# Patient Record
Sex: MALE | Race: WHITE | NOT HISPANIC OR LATINO | Employment: FULL TIME | URBAN - METROPOLITAN AREA
[De-identification: names, ages, dates, MRNs, and addresses within clinical notes are randomized per-mention and may not be internally consistent; named-entity substitution may affect disease eponyms.]

---

## 2021-08-07 PROBLEM — M25.529 PAIN IN ELBOW: Status: ACTIVE | Noted: 2020-07-02

## 2021-08-07 PROBLEM — K64.5 THROMBOSED EXTERNAL HEMORRHOIDS: Status: ACTIVE | Noted: 2021-05-20

## 2021-08-07 PROBLEM — E66.9 OBESITY: Status: ACTIVE | Noted: 2020-07-02

## 2023-03-03 ENCOUNTER — TELEPHONE (OUTPATIENT)
Dept: FAMILY MEDICINE CLINIC | Facility: CLINIC | Age: 38
End: 2023-03-03

## 2023-03-03 NOTE — TELEPHONE ENCOUNTER
Caller: Christine Ng    Relationship to patient: Emergency Contact    Best call back number: [phone]    New or established patient?  [x] New  [] Established    Date of discharge: 3/3/23    Facility discharged from: 3/3/23    Diagnosis/Symptoms: NAUSEA, HEAD ACHES, DIZZINESS, HIGH BP    Length of stay (If applicable): ER RELEASED SAME DAY     Specialty Only: Did you see a [facility] provider?    [] Yes  [] No  If so, who?    SCHEDULED WITH DR LUCIA ON 3/6/23

## 2023-03-06 ENCOUNTER — OFFICE VISIT (OUTPATIENT)
Dept: FAMILY MEDICINE CLINIC | Facility: CLINIC | Age: 38
End: 2023-03-06
Payer: COMMERCIAL

## 2023-03-06 VITALS
SYSTOLIC BLOOD PRESSURE: 160 MMHG | OXYGEN SATURATION: 97 % | TEMPERATURE: 98.6 F | WEIGHT: 271.4 LBS | RESPIRATION RATE: 20 BRPM | HEART RATE: 77 BPM | HEIGHT: 71 IN | DIASTOLIC BLOOD PRESSURE: 100 MMHG | BODY MASS INDEX: 37.99 KG/M2

## 2023-03-06 DIAGNOSIS — G47.30 SLEEP APNEA, UNSPECIFIED TYPE: ICD-10-CM

## 2023-03-06 DIAGNOSIS — I10 HYPERTENSION, UNSPECIFIED TYPE: Primary | ICD-10-CM

## 2023-03-06 DIAGNOSIS — E66.01 CLASS 2 SEVERE OBESITY WITH SERIOUS COMORBIDITY AND BODY MASS INDEX (BMI) OF 37.0 TO 37.9 IN ADULT, UNSPECIFIED OBESITY TYPE: ICD-10-CM

## 2023-03-06 PROCEDURE — 99214 OFFICE O/P EST MOD 30 MIN: CPT | Performed by: FAMILY MEDICINE

## 2023-03-06 RX ORDER — AMLODIPINE BESYLATE 5 MG/1
5 TABLET ORAL DAILY
Qty: 30 TABLET | Refills: 1 | Status: SHIPPED | OUTPATIENT
Start: 2023-03-06 | End: 2023-03-20 | Stop reason: DRUGHIGH

## 2023-03-06 NOTE — PROGRESS NOTES
Chief Complaint  Hypertension (Patient stated his BP was high last Friday 168/106. Went to the ER at [facility] on Friday.), Establish Care, and Headache    37-year-old male presents to the office to establish care and for the evaluation of high blood pressure.    Patient was seen in the urgent care on March 3, 2023 for elevated blood pressure, high blood pressure was 160s over 100.  Patient was experiencing headache, blurred vision and lightheadedness.  Patient was then sent to the ED, blood pressure was around 150s/100s however his headache and lightheadedness.  Patient refused work-up in the ED and preferred to see primary care for labs and treatment.    Patient has history of hypercholesterolemia, denies history of other chronic medical problems.     Today patient denies lightheadedness or blurred vision, he does have a headache, however he states he has this headache every day, more severe in the morning when he wakes up.  The patient does not snore and stop breathing at during sleep per his wife.    Patient denies chest pain, shortness of breath, palpitation, lower extremity swelling.      Tommy         Bernard Hernandez presents to [facility]  History of Present Illness      Objective     Review of Systems   Constitutional: Negative for activity change.   Respiratory: Negative for chest tightness and shortness of breath.    Cardiovascular: Negative for chest pain, palpitations and leg swelling.   Gastrointestinal: Negative for abdominal pain, constipation, diarrhea, nausea and vomiting.   Endocrine: Negative for polydipsia and polyuria.   Genitourinary: Negative for nocturia.   Neurological: Positive for headache. Negative for dizziness, seizures, light-headedness and numbness.        Past Medical History:   Diagnosis Date   • Fracture, finger, distal phalanx     Left        Current Outpatient Medications:   •  acetaminophen (TYLENOL) 500 MG tablet, 1 tablet As  "Needed., Disp: , Rfl:   •  amLODIPine (NORVASC) 5 MG tablet, Take 1 tablet by mouth Daily., Disp: 30 tablet, Rfl: 1   Social History     Socioeconomic History   • Marital status:    Tobacco Use   • Smoking status: Never   • Smokeless tobacco: Never   Vaping Use   • Vaping Use: Never used   Substance and Sexual Activity   • Alcohol use: No   • Drug use: No   • Sexual activity: Never      Vital Signs:   /100   Pulse 77   Temp 98.6 °F (37 °C)   Resp 20   Ht 180.3 cm (71\")   Wt 123 kg (271 lb 6.4 oz)   SpO2 97%   BMI 37.85 kg/m²       Physical Exam  Constitutional:       General: He is not in acute distress.     Appearance: He is obese. He is not ill-appearing.   HENT:      Head: Normocephalic and atraumatic.   Eyes:      Extraocular Movements: Extraocular movements intact.      Pupils: Pupils are equal, round, and reactive to light.   Neck:      Vascular: No carotid bruit.   Cardiovascular:      Rate and Rhythm: Normal rate and regular rhythm.      Pulses: Normal pulses.      Heart sounds: No murmur heard.  Pulmonary:      Effort: Pulmonary effort is normal.      Breath sounds: Normal breath sounds. No rales.   Abdominal:      General: Bowel sounds are normal. There is no distension.      Palpations: Abdomen is soft. There is no mass.      Tenderness: There is no abdominal tenderness. There is no guarding.      Hernia: No hernia is present.   Musculoskeletal:      Right lower leg: No edema.      Left lower leg: No edema.   Lymphadenopathy:      Cervical: No cervical adenopathy.   Skin:     Capillary Refill: Capillary refill takes less than 2 seconds.   Neurological:      Mental Status: He is alert.      GCS: GCS eye subscore is 4. GCS verbal subscore is 5. GCS motor subscore is 6.      Deep Tendon Reflexes:      Reflex Scores:       Patellar reflexes are 2+ on the right side and 2+ on the left side.         Result Review :                 Assessment and Plan    Diagnoses and all orders for this " visit:    1. Hypertension, unspecified type (Primary)  -     CBC & Differential; Future  -     Lipid panel; Future  -     Comprehensive metabolic panel; Future  -     TSH Rfx On Abnormal To Free T4; Future  -     amLODIPine (NORVASC) 5 MG tablet; Take 1 tablet by mouth Daily.  Dispense: 30 tablet; Refill: 1  -     TSH Rfx On Abnormal To Free T4  -     Comprehensive metabolic panel  -     Lipid panel  -     CBC & Differential    2. Sleep apnea, unspecified type    3. Class 2 severe obesity with serious comorbidity and body mass index (BMI) of 37.0 to 37.9 in adult, unspecified obesity type (HCC)  -     Hemoglobin A1c; Future  -     Hemoglobin A1c          Hypertension:  His blood pressure today is 160/100,  Will get routine labs, CBC and CMP  Will start patient on amlodipine 5 mg  Advised patient to reduce red meat, carbohydrates and salt in his diet, I also advised with increased exercise and starting Mediterranean and DASH diet    Obesity  Increase exercise 20 to 30 minutes daily  Avoid fatty food  Lipid panel and A1c and TSH    Sleep apnea  The wife describes episode complete stop breathing during sleep, this is most likely obstructive sleep apnea due to obesity, I will refer patient to sleep medicine for sleep study.    Patient verbalized understanding and agreement with the plan above.  Return in 2 weeks for follow-up      Follow Up   Return in about 2 weeks (around 3/20/2023).  Patient was given instructions and counseling regarding his condition or for health maintenance advice. Please see specific information pulled into the AVS if appropriate.

## 2023-03-09 LAB
ALBUMIN SERPL-MCNC: 4.6 G/DL (ref 3.5–5.2)
ALBUMIN/GLOB SERPL: 1.8 G/DL
ALP SERPL-CCNC: 81 U/L (ref 39–117)
ALT SERPL-CCNC: 29 U/L (ref 1–41)
AST SERPL-CCNC: 18 U/L (ref 1–40)
BASOPHILS # BLD AUTO: 0.09 10*3/MM3 (ref 0–0.2)
BASOPHILS NFR BLD AUTO: 1.2 % (ref 0–1.5)
BILIRUB SERPL-MCNC: 0.6 MG/DL (ref 0–1.2)
BUN SERPL-MCNC: 13 MG/DL (ref 6–20)
BUN/CREAT SERPL: 14.6 (ref 7–25)
CALCIUM SERPL-MCNC: 10.1 MG/DL (ref 8.6–10.5)
CHLORIDE SERPL-SCNC: 102 MMOL/L (ref 98–107)
CHOLEST SERPL-MCNC: 225 MG/DL (ref 0–200)
CO2 SERPL-SCNC: 29.4 MMOL/L (ref 22–29)
CREAT SERPL-MCNC: 0.89 MG/DL (ref 0.76–1.27)
EGFRCR SERPLBLD CKD-EPI 2021: 111.8 ML/MIN/1.73
EOSINOPHIL # BLD AUTO: 0.11 10*3/MM3 (ref 0–0.4)
EOSINOPHIL NFR BLD AUTO: 1.4 % (ref 0.3–6.2)
ERYTHROCYTE [DISTWIDTH] IN BLOOD BY AUTOMATED COUNT: 13.1 % (ref 12.3–15.4)
GLOBULIN SER CALC-MCNC: 2.6 GM/DL
GLUCOSE SERPL-MCNC: 91 MG/DL (ref 65–99)
HBA1C MFR BLD: 5.6 % (ref 4.8–5.6)
HCT VFR BLD AUTO: 44.2 % (ref 37.5–51)
HDLC SERPL-MCNC: 50 MG/DL (ref 40–60)
HGB BLD-MCNC: 14.6 G/DL (ref 13–17.7)
IMM GRANULOCYTES # BLD AUTO: 0.02 10*3/MM3 (ref 0–0.05)
IMM GRANULOCYTES NFR BLD AUTO: 0.3 % (ref 0–0.5)
LDLC SERPL CALC-MCNC: 153 MG/DL (ref 0–100)
LYMPHOCYTES # BLD AUTO: 2.06 10*3/MM3 (ref 0.7–3.1)
LYMPHOCYTES NFR BLD AUTO: 26.6 % (ref 19.6–45.3)
MCH RBC QN AUTO: 28.6 PG (ref 26.6–33)
MCHC RBC AUTO-ENTMCNC: 33 G/DL (ref 31.5–35.7)
MCV RBC AUTO: 86.5 FL (ref 79–97)
MONOCYTES # BLD AUTO: 0.53 10*3/MM3 (ref 0.1–0.9)
MONOCYTES NFR BLD AUTO: 6.9 % (ref 5–12)
NEUTROPHILS # BLD AUTO: 4.92 10*3/MM3 (ref 1.7–7)
NEUTROPHILS NFR BLD AUTO: 63.6 % (ref 42.7–76)
NRBC BLD AUTO-RTO: 0 /100 WBC (ref 0–0.2)
PLATELET # BLD AUTO: 353 10*3/MM3 (ref 140–450)
POTASSIUM SERPL-SCNC: 5.2 MMOL/L (ref 3.5–5.2)
PROT SERPL-MCNC: 7.2 G/DL (ref 6–8.5)
RBC # BLD AUTO: 5.11 10*6/MM3 (ref 4.14–5.8)
SODIUM SERPL-SCNC: 139 MMOL/L (ref 136–145)
TRIGL SERPL-MCNC: 123 MG/DL (ref 0–150)
TSH SERPL DL<=0.005 MIU/L-ACNC: 1.32 UIU/ML (ref 0.27–4.2)
VLDLC SERPL CALC-MCNC: 22 MG/DL (ref 5–40)
WBC # BLD AUTO: 7.73 10*3/MM3 (ref 3.4–10.8)

## 2023-03-13 ENCOUNTER — PATIENT ROUNDING (BHMG ONLY) (OUTPATIENT)
Dept: FAMILY MEDICINE CLINIC | Facility: CLINIC | Age: 38
End: 2023-03-13
Payer: COMMERCIAL

## 2023-03-13 NOTE — PROGRESS NOTES
Sent Patient following in [employer] Message:  My name is Jenn Cunningham      I am the Practice Manager with   [facility] [place]  [address], [address]  [place] [address] [phone].      I am messaging to officially welcome you to our practice and ask about your recent visit.     Tell me about your visit with us. What things went well?         We're always looking for ways to make our patients' experiences even better. Do you have recommendations on ways we may improve?       Overall were you satisfied with your first visit to our practice?        Is there anything else I can do for you?     You may receive a survey from [employer] via text or email to provide feedback about your visit.  We ask that you please take a few minutes to complete the survey and let us know how we are doing.  If for any reason you feel unable to give us the highest rating please let me know.      Thank you, and have a great day.

## 2023-03-20 ENCOUNTER — OFFICE VISIT (OUTPATIENT)
Dept: FAMILY MEDICINE CLINIC | Facility: CLINIC | Age: 38
End: 2023-03-20
Payer: COMMERCIAL

## 2023-03-20 VITALS
WEIGHT: 271.4 LBS | SYSTOLIC BLOOD PRESSURE: 138 MMHG | RESPIRATION RATE: 18 BRPM | DIASTOLIC BLOOD PRESSURE: 100 MMHG | BODY MASS INDEX: 37.85 KG/M2 | HEART RATE: 110 BPM | TEMPERATURE: 98.2 F | OXYGEN SATURATION: 96 %

## 2023-03-20 DIAGNOSIS — I10 HYPERTENSION, UNSPECIFIED TYPE: ICD-10-CM

## 2023-03-20 PROCEDURE — 99214 OFFICE O/P EST MOD 30 MIN: CPT | Performed by: FAMILY MEDICINE

## 2023-03-20 RX ORDER — HYDROCHLOROTHIAZIDE 12.5 MG/1
12.5 TABLET ORAL DAILY
Qty: 30 TABLET | Refills: 0 | Status: SHIPPED | OUTPATIENT
Start: 2023-03-20 | End: 2023-03-27 | Stop reason: DRUGHIGH

## 2023-03-20 RX ORDER — AMLODIPINE BESYLATE 10 MG/1
10 TABLET ORAL DAILY
Qty: 30 TABLET | Refills: 0 | Status: SHIPPED | OUTPATIENT
Start: 2023-03-20

## 2023-03-20 NOTE — PROGRESS NOTES
Chief Complaint  Follow-up and Hypertension    Subjective         Bernard Ng presents to [facility]  History of Present Illness      37-year-old male presents to the office for hypertension follow-up.  Patient was started on amlodipine 5 mg for hypertension 2 weeks ago.  His lab work showed normal kidney function and normal TSH.  Since starting the medication 2 weeks ago blood pressure readings at home are consistently high 150s-170s /100s.  Patient denies associated symptoms with the high blood pressure other than occasional headache.  Patient denies chest pain or shortness of breath.  Patient denies medication side effects, lightheadedness or dizziness.    Patient states he has been trying to eat better, he has reduced his meal size to half, however he still did not lose weight yet.  Sleep medicine referral pending for sleep apnea study.    Objective     Review of Systems     Past Medical History:   Diagnosis Date   • Fracture, finger, distal phalanx     Left        Current Outpatient Medications:   •  acetaminophen (TYLENOL) 500 MG tablet, 1 tablet As Needed. (Patient not taking: Reported on 3/20/2023), Disp: , Rfl:   •  amLODIPine (NORVASC) 10 MG tablet, Take 1 tablet by mouth Daily., Disp: 30 tablet, Rfl: 0  •  hydroCHLOROthiazide (HYDRODIURIL) 12.5 MG tablet, Take 1 tablet by mouth Daily., Disp: 30 tablet, Rfl: 0   Social History     Socioeconomic History   • Marital status:    Tobacco Use   • Smoking status: Never   • Smokeless tobacco: Never   Vaping Use   • Vaping Use: Never used   Substance and Sexual Activity   • Alcohol use: No   • Drug use: No   • Sexual activity: Never      Vital Signs:   /100   Pulse 110   Temp 98.2 °F (36.8 °C)   Resp 18   Wt 123 kg (271 lb 6.4 oz)   SpO2 96%   BMI 37.85 kg/m²       Physical Exam  Cardiovascular:      Rate and Rhythm: Normal rate and regular rhythm.      Heart sounds: No murmur heard.  Pulmonary:      Effort:  Pulmonary effort is normal. No respiratory distress.   Skin:     Capillary Refill: Capillary refill takes less than 2 seconds.   Neurological:      Mental Status: He is alert.          Result Review :                 Assessment and Plan    Diagnoses and all orders for this visit:    1. Hypertension, unspecified type  -     hydroCHLOROthiazide (HYDRODIURIL) 12.5 MG tablet; Take 1 tablet by mouth Daily.  Dispense: 30 tablet; Refill: 0  -     amLODIPine (NORVASC) 10 MG tablet; Take 1 tablet by mouth Daily.  Dispense: 30 tablet; Refill: 0  -     Urinalysis With Microscopic - Urine, Clean Catch      · Blood pressure constantly elevated at home, it is elevated in the office today 138/100.  · Asymptomatic except for occasional headaches  · Blood work unremarkable  · Will increase Norvasc to 10 mg  · We will add hydrochlorothiazide 12.5 mg  · Get urinalysis today  · Repeat BMP next week  · Follow-up next week  · Advised patient to call back if he experienced any side effects, dizziness or lightheadedness  · Advised patient to go to the ED if he experienced any chest pain or shortness of breath  ·       Follow Up   Return in about 1 week (around 3/27/2023).  Patient was given instructions and counseling regarding his condition or for health maintenance advice. Please see specific information pulled into the AVS if appropriate.

## 2023-03-21 LAB
APPEARANCE UR: CLEAR
BACTERIA #/AREA URNS HPF: NORMAL /[HPF]
BILIRUB UR QL STRIP: NEGATIVE
CASTS URNS QL MICRO: NORMAL /LPF
COLOR UR: YELLOW
EPI CELLS #/AREA URNS HPF: NORMAL /HPF (ref 0–10)
GLUCOSE UR QL STRIP: NEGATIVE
HGB UR QL STRIP: NEGATIVE
KETONES UR QL STRIP: NEGATIVE
LEUKOCYTE ESTERASE UR QL STRIP: NEGATIVE
MICRO URNS: NORMAL
MICRO URNS: NORMAL
NITRITE UR QL STRIP: NEGATIVE
PH UR STRIP: 5.5 [PH] (ref 5–7.5)
PROT UR QL STRIP: NEGATIVE
RBC #/AREA URNS HPF: NORMAL /HPF (ref 0–2)
SP GR UR STRIP: 1.02 (ref 1–1.03)
UROBILINOGEN UR STRIP-MCNC: 0.2 MG/DL (ref 0.2–1)
WBC #/AREA URNS HPF: NORMAL /HPF (ref 0–5)

## 2023-03-21 RX ORDER — DIAPER,BRIEF,INFANT-TODD,DISP
EACH MISCELLANEOUS
COMMUNITY
End: 2023-03-24

## 2023-03-27 ENCOUNTER — OFFICE VISIT (OUTPATIENT)
Dept: FAMILY MEDICINE CLINIC | Facility: CLINIC | Age: 38
End: 2023-03-27
Payer: COMMERCIAL

## 2023-03-27 VITALS
HEIGHT: 71 IN | BODY MASS INDEX: 37.52 KG/M2 | OXYGEN SATURATION: 97 % | HEART RATE: 102 BPM | DIASTOLIC BLOOD PRESSURE: 98 MMHG | WEIGHT: 268 LBS | SYSTOLIC BLOOD PRESSURE: 130 MMHG | RESPIRATION RATE: 20 BRPM | TEMPERATURE: 98.2 F

## 2023-03-27 DIAGNOSIS — I10 HYPERTENSION, UNSPECIFIED TYPE: ICD-10-CM

## 2023-03-27 PROCEDURE — 99214 OFFICE O/P EST MOD 30 MIN: CPT | Performed by: FAMILY MEDICINE

## 2023-03-27 RX ORDER — HYDROCHLOROTHIAZIDE 25 MG/1
25 TABLET ORAL DAILY
Qty: 30 TABLET | Refills: 1 | Status: SHIPPED | OUTPATIENT
Start: 2023-03-27

## 2023-03-27 RX ORDER — AZITHROMYCIN 250 MG/1
TABLET, FILM COATED ORAL
COMMUNITY
Start: 2023-03-27 | End: 2023-03-27

## 2023-03-27 NOTE — PROGRESS NOTES
"Chief Complaint  Follow-up (Hypertension and hospital for pneumonia.)    37-year-old male presents to the office for hypertension follow-up.  Last visit Norvasc was increased to 10 mg and patient was started on hydralazine 12.5 mg.  Patient is denies side effects.  Patient has been measuring his blood pressure at at home daily, values range from 140/90 to 170/100.  Patient denies symptoms with his blood pressure readings.  Patient denies chest pain, shortness of breath, lower extremity swelling.    Tommy Ng presents to [facility]  History of Present Illness      Objective     Review of Systems     Past Medical History:   Diagnosis Date   • Fracture, finger, distal phalanx     Left        Current Outpatient Medications:   •  amLODIPine (NORVASC) 10 MG tablet, Take 1 tablet by mouth Daily., Disp: 30 tablet, Rfl: 0  •  hydroCHLOROthiazide (HYDRODIURIL) 25 MG tablet, Take 1 tablet by mouth Daily., Disp: 30 tablet, Rfl: 1   Social History     Socioeconomic History   • Marital status:    Tobacco Use   • Smoking status: Never   • Smokeless tobacco: Never   Vaping Use   • Vaping Use: Never used   Substance and Sexual Activity   • Alcohol use: No   • Drug use: No   • Sexual activity: Never      Vital Signs:   /98   Pulse 102   Temp 98.2 °F (36.8 °C)   Resp 20   Ht 180.3 cm (71\")   Wt 122 kg (268 lb)   SpO2 97%   BMI 37.38 kg/m²       Physical Exam  Constitutional:       Appearance: Normal appearance.   Cardiovascular:      Rate and Rhythm: Normal rate and regular rhythm.   Pulmonary:      Effort: Pulmonary effort is normal.      Breath sounds: Normal breath sounds.   Musculoskeletal:      Right lower leg: No edema.      Left lower leg: No edema.   Neurological:      Mental Status: He is alert.          Result Review :                 Assessment and Plan    Diagnoses and all orders for this visit:    1. Hypertension, unspecified type  -     " hydroCHLOROthiazide (HYDRODIURIL) 25 MG tablet; Take 1 tablet by mouth Daily.  Dispense: 30 tablet; Refill: 1  -     Basic metabolic panel    · Blood pressure in the office today 130/100  · Increase hydrochlorothiazide to 25 mg daily  · Repeat BMP due to patient on Lasix.  · Follow-up in 2 weeks  · Advised patient to call back if he developed any worsening symptoms or medication side effects  · Patient verbalized understanding and agreement with the plan above.        Follow Up   Return in about 2 weeks (around 4/10/2023).  Patient was given instructions and counseling regarding his condition or for health maintenance advice. Please see specific information pulled into the AVS if appropriate.

## 2023-03-28 LAB
BUN SERPL-MCNC: 14 MG/DL (ref 6–20)
BUN/CREAT SERPL: 14 (ref 9–20)
CALCIUM SERPL-MCNC: 10.1 MG/DL (ref 8.7–10.2)
CHLORIDE SERPL-SCNC: 100 MMOL/L (ref 96–106)
CO2 SERPL-SCNC: 26 MMOL/L (ref 20–29)
CREAT SERPL-MCNC: 0.99 MG/DL (ref 0.76–1.27)
EGFRCR SERPLBLD CKD-EPI 2021: 99 ML/MIN/1.73
GLUCOSE SERPL-MCNC: 79 MG/DL (ref 70–99)
POTASSIUM SERPL-SCNC: 4.8 MMOL/L (ref 3.5–5.2)
SODIUM SERPL-SCNC: 142 MMOL/L (ref 134–144)

## 2023-04-11 ENCOUNTER — OFFICE VISIT (OUTPATIENT)
Dept: FAMILY MEDICINE CLINIC | Facility: CLINIC | Age: 38
End: 2023-04-11
Payer: COMMERCIAL

## 2023-04-11 VITALS
BODY MASS INDEX: 37.63 KG/M2 | TEMPERATURE: 98 F | WEIGHT: 268.8 LBS | SYSTOLIC BLOOD PRESSURE: 112 MMHG | RESPIRATION RATE: 20 BRPM | DIASTOLIC BLOOD PRESSURE: 86 MMHG | OXYGEN SATURATION: 97 % | HEIGHT: 71 IN | HEART RATE: 102 BPM

## 2023-04-11 DIAGNOSIS — I10 HYPERTENSION, UNSPECIFIED TYPE: Primary | ICD-10-CM

## 2023-04-11 RX ORDER — HYDROCHLOROTHIAZIDE 50 MG/1
50 TABLET ORAL DAILY
Qty: 30 TABLET | Refills: 1 | Status: SHIPPED | OUTPATIENT
Start: 2023-04-11

## 2023-04-11 NOTE — PROGRESS NOTES
"Chief Complaint  Follow-up (hypertension)    Subjective         Bernard Ng presents to [facility]  History of Present Illness    37-year-old male presents to the office for hypertension follow-up.  Hydrocodone was increased to 25 mg last visit, he is also on moderate pain 10 mg.  Patient denies medication side effects, he denies dizziness, lightheadedness, chest pain, heart palpitation or lower extremity swelling.  Patient states that he is trying to lose weight.  Patient has been keeping a log of his blood pressure at home.  Ranges from 1  / 90s to 100s, without associated symptoms.      Objective     Review of Systems     Past Medical History:   Diagnosis Date   • Fracture, finger, distal phalanx     Left        Current Outpatient Medications:   •  amLODIPine (NORVASC) 10 MG tablet, Take 1 tablet by mouth Daily., Disp: 30 tablet, Rfl: 0  •  hydroCHLOROthiazide (HYDRODIURIL) 50 MG tablet, Take 1 tablet by mouth Daily., Disp: 30 tablet, Rfl: 1   Social History     Socioeconomic History   • Marital status:    Tobacco Use   • Smoking status: Never   • Smokeless tobacco: Never   Vaping Use   • Vaping Use: Never used   Substance and Sexual Activity   • Alcohol use: No   • Drug use: No   • Sexual activity: Never      Vital Signs:   /86 (BP Location: Right arm)   Pulse 102   Temp 98 °F (36.7 °C)   Resp 20   Ht 180.3 cm (71\")   Wt 122 kg (268 lb 12.8 oz)   SpO2 97%   BMI 37.49 kg/m²       Physical Exam  HENT:      Head: Normocephalic and atraumatic.   Cardiovascular:      Rate and Rhythm: Normal rate and regular rhythm.      Pulses: Normal pulses.      Heart sounds: No murmur heard.  Pulmonary:      Effort: Pulmonary effort is normal. No respiratory distress.      Breath sounds: Normal breath sounds.   Musculoskeletal:      Right lower leg: No edema.      Left lower leg: No edema.          Result Review :                 Assessment and Plan    Diagnoses and all " orders for this visit:    1. Hypertension, unspecified type (Primary)  -     hydroCHLOROthiazide (HYDRODIURIL) 50 MG tablet; Take 1 tablet by mouth Daily.  Dispense: 30 tablet; Refill: 1  -     Basic metabolic panel  -     Blood Pressure Device  -     Basic metabolic panel; Future      · Hypertension still not very well controlled.  · There is a discrepancy between the blood pressure measured at the office and at home.  · Today 114/88 measured by me in the office, continues to have diastolic hypertension.  · I will increase his hydrochlorothiazide to 50 mg  · Repeat BMP today and next week  · Advised patient to call if he developed any side effects, dizziness or lightheadedness.  · Patient verbalized understanding and agreement with the plan above  · Follow-up in 3 weeks.      Follow Up   Return in about 3 weeks (around 5/2/2023) for Recheck.  Patient was given instructions and counseling regarding his condition or for health maintenance advice. Please see specific information pulled into the AVS if appropriate.

## 2023-04-12 LAB
BUN SERPL-MCNC: 12 MG/DL (ref 6–20)
BUN/CREAT SERPL: 12 (ref 9–20)
CALCIUM SERPL-MCNC: 9.8 MG/DL (ref 8.7–10.2)
CHLORIDE SERPL-SCNC: 99 MMOL/L (ref 96–106)
CO2 SERPL-SCNC: 24 MMOL/L (ref 20–29)
CREAT SERPL-MCNC: 0.98 MG/DL (ref 0.76–1.27)
EGFRCR SERPLBLD CKD-EPI 2021: 101 ML/MIN/1.73
GLUCOSE SERPL-MCNC: 93 MG/DL (ref 70–99)
POTASSIUM SERPL-SCNC: 4.2 MMOL/L (ref 3.5–5.2)
SODIUM SERPL-SCNC: 140 MMOL/L (ref 134–144)

## 2023-04-27 DIAGNOSIS — I10 HYPERTENSION, UNSPECIFIED TYPE: ICD-10-CM

## 2023-04-27 RX ORDER — AMLODIPINE BESYLATE 10 MG/1
TABLET ORAL
Qty: 30 TABLET | Refills: 0 | OUTPATIENT
Start: 2023-04-27

## 2023-04-27 RX ORDER — AMLODIPINE BESYLATE 10 MG/1
10 TABLET ORAL DAILY
Qty: 90 TABLET | Refills: 1 | Status: SHIPPED | OUTPATIENT
Start: 2023-04-27

## 2023-05-02 ENCOUNTER — OFFICE VISIT (OUTPATIENT)
Dept: FAMILY MEDICINE CLINIC | Facility: CLINIC | Age: 38
End: 2023-05-02
Payer: COMMERCIAL

## 2023-05-02 VITALS
WEIGHT: 268.2 LBS | OXYGEN SATURATION: 98 % | DIASTOLIC BLOOD PRESSURE: 80 MMHG | TEMPERATURE: 98.4 F | BODY MASS INDEX: 37.55 KG/M2 | HEART RATE: 95 BPM | HEIGHT: 71 IN | SYSTOLIC BLOOD PRESSURE: 120 MMHG | RESPIRATION RATE: 18 BRPM

## 2023-05-02 DIAGNOSIS — I10 HYPERTENSION, UNSPECIFIED TYPE: ICD-10-CM

## 2023-05-02 NOTE — PROGRESS NOTES
"Chief Complaint  Follow-up    Subjective         Bernard Ng presents to [facility]  History of Present Illness    37-year-old male presents to the office for hypertension follow-up.  Hydrochlorothiazide was increased to 50 mg daily 3 weeks ago  He is also on amlodipine 10 mg  Patient denies side effects, lightheadedness, dizziness, chest pain or shortness of breath  Patient has been measuring his blood pressure constantly at home, it has been around 140s to 160s systolic over 90s diastolic.  Patient reports measuring his blood pressure in the morning about 5 days after taking the medication and in the evening.    Objective     Review of Systems     Past Medical History:   Diagnosis Date   • Fracture, finger, distal phalanx     Left        Current Outpatient Medications:   •  amLODIPine (NORVASC) 10 MG tablet, Take 1 tablet by mouth Daily., Disp: 90 tablet, Rfl: 1  •  hydroCHLOROthiazide (HYDRODIURIL) 50 MG tablet, Take 1 tablet by mouth Daily., Disp: 30 tablet, Rfl: 1   Social History     Socioeconomic History   • Marital status:    Tobacco Use   • Smoking status: Never   • Smokeless tobacco: Never   Vaping Use   • Vaping Use: Never used   Substance and Sexual Activity   • Alcohol use: No   • Drug use: No   • Sexual activity: Never      Vital Signs:   /80   Pulse 95   Temp 98.4 °F (36.9 °C)   Resp 18   Ht 180.3 cm (71\")   Wt 122 kg (268 lb 3.2 oz)   SpO2 98%   BMI 37.41 kg/m²       Physical Exam  Constitutional:       Appearance: Normal appearance.   HENT:      Head: Normocephalic and atraumatic.   Cardiovascular:      Rate and Rhythm: Normal rate and regular rhythm.      Pulses: Normal pulses.      Heart sounds: No murmur heard.  Pulmonary:      Effort: Pulmonary effort is normal.   Musculoskeletal:      Right lower leg: No edema.      Left lower leg: No edema.   Neurological:      Mental Status: He is alert.          Result Review :                 Assessment " and Plan    Diagnoses and all orders for this visit:    1. Hypertension, unspecified type    · Blood pressure is 120/80 in the office today measured by me, which is consistent with prior visits.  · Continue hydrochlorothiazide 50 mg and amlodipine 10 mg  · Advised patient to measure blood pressure at least 30 minutes to 1 hour after taking the medication  · Follow-up in 1 month for routine annual check      Follow Up   Return in about 1 month (around 6/2/2023) for Annual physical.  Patient was given instructions and counseling regarding his condition or for health maintenance advice. Please see specific information pulled into the AVS if appropriate.

## 2023-06-06 ENCOUNTER — OFFICE VISIT (OUTPATIENT)
Dept: FAMILY MEDICINE CLINIC | Facility: CLINIC | Age: 38
End: 2023-06-06
Payer: COMMERCIAL

## 2023-06-06 VITALS
DIASTOLIC BLOOD PRESSURE: 82 MMHG | HEART RATE: 108 BPM | SYSTOLIC BLOOD PRESSURE: 120 MMHG | HEIGHT: 71 IN | TEMPERATURE: 97.8 F | RESPIRATION RATE: 20 BRPM | WEIGHT: 264 LBS | BODY MASS INDEX: 36.96 KG/M2 | OXYGEN SATURATION: 96 %

## 2023-06-06 DIAGNOSIS — M79.671 RIGHT FOOT PAIN: ICD-10-CM

## 2023-06-06 DIAGNOSIS — Z00.01 ENCOUNTER FOR GENERAL ADULT MEDICAL EXAMINATION WITH ABNORMAL FINDINGS: Primary | ICD-10-CM

## 2023-06-06 DIAGNOSIS — E78.5 HYPERLIPIDEMIA, UNSPECIFIED HYPERLIPIDEMIA TYPE: ICD-10-CM

## 2023-06-06 DIAGNOSIS — I10 PRIMARY HYPERTENSION: ICD-10-CM

## 2023-06-06 DIAGNOSIS — E66.01 CLASS 2 SEVERE OBESITY DUE TO EXCESS CALORIES WITH SERIOUS COMORBIDITY AND BODY MASS INDEX (BMI) OF 36.0 TO 36.9 IN ADULT: ICD-10-CM

## 2023-06-06 RX ORDER — MELOXICAM 7.5 MG/1
7.5 TABLET ORAL DAILY
Qty: 30 TABLET | Refills: 0 | Status: SHIPPED | OUTPATIENT
Start: 2023-06-06

## 2023-06-06 NOTE — PATIENT INSTRUCTIONS
- Perform stretching exercises for the plantar fascia and calf muscles at home.  - Avoiding the use of flat shoes and barefoot walking.  - User prefabricated, over-the-counter, silicone heel shoe inserts (arch supports and/or heel cups).  - Decrease physical activities that may be causative or aggravating (eg, excessive running, dancing, or jumping

## 2023-06-06 NOTE — PROGRESS NOTES
Subjective   Bernard Ng is a 37 y.o. male who presents for annual male wellness exam.  Chief Complaint   Patient presents with    Annual Exam     Patient complaining of right foot pain for 2 weeks, first thing in the morning and after, starts in the heel and radiates  along the lasteral aspect on the planter suface of the foor toward the metatarsal  to the front of the foot. Sharp, lasts for a minute, 8/10. Relieved with walking.   Did not try anything for the pain.       Sexual History: no   Contraception: N/A  Diet: trying to eat healthy  Exercise: No, but working is physical mainly  Do you feel safe? yes  Have you ever been abused? no  Last dental exam: 3 months ago  Eye exam: 2 years ago    Colon Cancer Screening: no, no fhx of colon caner  PSA: no      Immunization History   Administered Date(s) Administered    Influenza, Unspecified 10/15/2022    Tdap 12/25/2016       The following portions of the patient's history were reviewed and updated as appropriate: allergies, current medications, past family history, past medical history, past social history, past surgical history and problem list.    Past Medical History:   Diagnosis Date    Fracture, finger, distal phalanx     Left       Past Surgical History:   Procedure Laterality Date    APPENDECTOMY      DENTAL PROCEDURE      Tooth extraction    HEMORRHOIDECTOMY         Family History   Problem Relation Age of Onset    Asthma Mother     Other Mother         Low back pain    Hypertension Mother     Diabetes Father     Lung cancer Father         smoker    No Known Problems Sister     Breast cancer Maternal Grandmother     Colon cancer Neg Hx        Social History     Socioeconomic History    Marital status:    Tobacco Use    Smoking status: Never    Smokeless tobacco: Never   Vaping Use    Vaping Use: Never used   Substance and Sexual Activity    Alcohol use: No    Drug use: No    Sexual activity: Never         Current Outpatient Medications:      amLODIPine (NORVASC) 10 MG tablet, Take 1 tablet by mouth Daily., Disp: 90 tablet, Rfl: 1    hydroCHLOROthiazide (HYDRODIURIL) 50 MG tablet, Take 1 tablet by mouth Daily., Disp: 30 tablet, Rfl: 1    meloxicam (Mobic) 7.5 MG tablet, Take 1 tablet by mouth Daily., Disp: 30 tablet, Rfl: 0    Review of Systems   Constitutional:  Negative for activity change, appetite change, fatigue, fever and unexpected weight change.   HENT:  Negative for ear pain, rhinorrhea and sore throat.    Eyes:  Negative for visual disturbance.   Respiratory:  Negative for cough and shortness of breath.    Cardiovascular:  Negative for chest pain, palpitations and leg swelling.   Gastrointestinal:  Negative for abdominal pain, blood in stool, constipation, diarrhea, nausea and vomiting.   Endocrine: Negative for cold intolerance, heat intolerance, polydipsia and polyuria.   Genitourinary:  Negative for dysuria and penile discharge.   Musculoskeletal:         Right foot pain   Neurological:  Negative for dizziness, weakness, light-headedness, numbness and headaches.   Psychiatric/Behavioral:  Negative for dysphoric mood.      Objective   Vitals:    06/06/23 1538   BP: 120/82   Pulse:    Resp:    Temp:    SpO2:      Body mass index is 36.82 kg/m².  Physical Exam  Constitutional:       General: He is not in acute distress.     Appearance: Normal appearance. He is obese. He is not ill-appearing.   HENT:      Head: Normocephalic and atraumatic.   Cardiovascular:      Rate and Rhythm: Normal rate and regular rhythm.      Pulses: Normal pulses.      Heart sounds: No murmur heard.  Pulmonary:      Effort: Pulmonary effort is normal.      Breath sounds: Normal breath sounds.   Abdominal:      General: Abdomen is flat. Bowel sounds are normal. There is no distension.      Palpations: Abdomen is soft. There is no mass.      Tenderness: There is no abdominal tenderness.   Musculoskeletal:      Right lower leg: No edema.      Left lower leg: No edema.       Comments: No heel or plantar fascial tenderness with palpation   Skin:     Capillary Refill: Capillary refill takes less than 2 seconds.   Neurological:      Mental Status: He is alert.   Psychiatric:         Mood and Affect: Mood normal.         Behavior: Behavior normal.         Assessment & Plan   Diagnoses and all orders for this visit:    1. Encounter for general adult medical examination with abnormal findings (Primary)    2. Primary hypertension    3. Hyperlipidemia, unspecified hyperlipidemia type    4. Class 2 severe obesity due to excess calories with serious comorbidity and body mass index (BMI) of 36.0 to 36.9 in adult    5. Right foot pain  -     XR Foot 3+ View Right  -     meloxicam (Mobic) 7.5 MG tablet; Take 1 tablet by mouth Daily.  Dispense: 30 tablet; Refill: 0      Annual physical/hypertension/hyperlipidemia/obesity  Blood pressure well controlled, continue amlodipine and hydrochlorothiazide  Exercise daily and healthy diet for obesity and hyperlipidemia    Foot pain  Suspicious for plantar fasciitis  Will get x-ray to rule out OA  Meloxicam for 1 month  Gave patient instructions for plantar fasciitis exercises.      Discussed the importance of maintaining a healthy weight and getting regular exercise.  Educated patient on the benefits of healthy diet.  Advise follow-up annually for wellness exams.    Patient Instructions   - Perform stretching exercises for the plantar fascia and calf muscles at home.  - Avoiding the use of flat shoes and barefoot walking.  - User prefabricated, over-the-counter, silicone heel shoe inserts (arch supports and/or heel cups).  - Decrease physical activities that may be causative or aggravating (eg, excessive running, dancing, or jumping

## 2023-10-19 DIAGNOSIS — I10 HYPERTENSION, UNSPECIFIED TYPE: ICD-10-CM

## 2023-10-19 RX ORDER — AMLODIPINE BESYLATE 10 MG/1
10 TABLET ORAL DAILY
Qty: 90 TABLET | Refills: 1 | Status: SHIPPED | OUTPATIENT
Start: 2023-10-19